# Patient Record
Sex: FEMALE | Race: OTHER
[De-identification: names, ages, dates, MRNs, and addresses within clinical notes are randomized per-mention and may not be internally consistent; named-entity substitution may affect disease eponyms.]

---

## 2019-07-21 ENCOUNTER — HOSPITAL ENCOUNTER (EMERGENCY)
Dept: HOSPITAL 62 - ER | Age: 29
Discharge: HOME | End: 2019-07-21
Payer: SELF-PAY

## 2019-07-21 VITALS — SYSTOLIC BLOOD PRESSURE: 120 MMHG | DIASTOLIC BLOOD PRESSURE: 71 MMHG

## 2019-07-21 DIAGNOSIS — R21: Primary | ICD-10-CM

## 2019-07-21 PROCEDURE — 99282 EMERGENCY DEPT VISIT SF MDM: CPT

## 2019-07-21 NOTE — ER DOCUMENT REPORT
HPI





- HPI


Time Seen by Provider: 07/21/19 19:33


Pain Level: 4


Notes: 





Patient is a 29-year-old female who presents complaining of a rash to her right 

plantar foot that is been present for a couple months without known offending 

agent.  Patient states that she does have dermatitis/eczema and is not sure if 

this is related.  Cortisone cream with minimal relief.  Patient states that the 

areas are starting to get a little thicker and harder and painful when she 

walks.  She has not noticed any redness or purulent discharge.  Denies any drug 

allergies.  She has not been seen for this issue before.  Denies any headache, 

fever, URI, sore throat, chest pain, palpitations, syncope, cough, shortness of 

breath, wheeze, dyspnea, abdominal pain, nausea/vomiting/diarrhea, urinary 

retention, dysuria, hematuria.





- ROS


Systems Reviewed and Negative: Yes All other systems reviewed and negative





- REPRODUCTIVE


Reproductive: DENIES: Pregnant:





Past Medical History





- Social History


Smoking Status: Never Smoker


Family History: CVA, DM, Hypertension


Neurological Medical History: Reports: Hx Migraine


Renal/ Medical History: Reports: Hx Kidney Stones.  Denies: Hx Peritoneal 

Dialysis


Psychiatric Medical History: Reports: Hx Anxiety





- Immunizations


Immunizations up to date: Yes


Hx Diphtheria, Pertussis, Tetanus Vaccination: Yes





Vertical Provider Document





- CONSTITUTIONAL


Agree With Documented VS: Yes


Notes: 





PHYSICAL EXAMINATION:





GENERAL: Well-appearing, well-nourished and in no acute distress.





LUNGS: Breath sounds clear to auscultation bilaterally and equal.  No wheezes 

rales or rhonchi.





HEART: Regular rate and rhythm without murmurs, rubs, gallops.





Musculoskeletal: Rt foot/ankle: No ecchymosis or deformity.  FROM to 

passive/active. Strength 5+/5. N/V intact distal.  No bony tenderness of the 

foot.  Achilles intact.  Lis Franc maneuver neg.  Anterior drawer neg.  + 

lichenified hyperpigmented skin rash/lesions on the plantar foot w/o tenderness,

erythema, abscess, or streaks.





Extremities:  No cyanosis, clubbing, or edema b/l.  Peripheral pulses 2+.  

Capillary refill less than 3 seconds.





NEUROLOGICAL: Normal speech, normal gait.  Normal sensory, motor exams 





PSYCH: Normal mood, normal affect.





SKIN: see above.





- INFECTION CONTROL


TRAVEL OUTSIDE OF THE [place] IN LAST 30 DAYS: No





Course





- Re-evaluation


Re-evalutation: 





07/21/19 19:51


Patient is an afebrile, well-hydrated, 29-year-old female who presents with skin

rash to the plantar right foot, unspecified, but appears benign.  Vitals are 

acceptable without significant tachycardia, tachypnea, or hypoxia.  PE is 

otherwise unremarkable vascular compromise, obvious tendon/leg rupture, obvious 

fracture/dislocation, septic joint.  There is no evidence of bacterial 

infection.  Patient is nontoxic-appearing and is able to tolerate p.o. without 

difficulty.  It has been an ongoing issue for the past couple months.  Cortisone

over-the-counter has not been ongoing for her.  We will try her on a fungal 

cream with expected follow-up to podiatry.  Low suspicion for any necrotizing 

fasciitis, SJS, SSS, drug reaction, sepsis, meningitis, syphilis, Lyme disease, 

Hensel spotted fever, or other systemic emergent condition at this time.  

Patient aware that condition can change from initial presentation and he needs 

to monitor symptoms closely and seek medical attention with any acute changes.  

Recheck with your PCM as needed otherwise.  Return to the ED with any other 

worsening/concerning symptoms as reviewed.  Patient is in agreement.





- Vital Signs


Vital signs: 


                                        











Temp Pulse Resp BP Pulse Ox


 


 98.4 F   90   14   120/71   98 


 


 07/21/19 19:27  07/21/19 19:27  07/21/19 19:27  07/21/19 19:27  07/21/19 19:27














Discharge





- Discharge


Clinical Impression: 


 Rash and nonspecific skin eruption





Condition: Stable


Disposition: HOME, SELF-CARE


Additional Instructions: 


Keep the skin clean


Wash with soap and water


Tylenol/ibuprofen if needed


Triple antibiotic ointment daily for any break in the skin


Epsom salt soaks


Avoid submersion in ocean or pool water for now


Take medication as directed


Monitor for any worsening symptoms


Recheck with your PCM in 3-5 days


Schedule consult podiatry


Return to the ED with any worsening symptoms and/or development of fever, 

headache, chest pain, palpitations, syncope, shortness of breath, trouble 

breathing, abdominal pain, n/v/d, abscess, purulent discharge, red streaks, 

worsening swelling, or other worsening symptoms that are concerning to you.


Prescriptions: 


Clotrimazole [Athletic Foot Cream] 1 applic TP BID #30 gm


Referrals: 


JACKSON SERRATO DPM [ACTIVE STAFF] - Follow up in 3-5 days

## 2020-01-04 ENCOUNTER — HOSPITAL ENCOUNTER (EMERGENCY)
Dept: HOSPITAL 62 - ER | Age: 30
Discharge: HOME | End: 2020-01-04
Payer: SELF-PAY

## 2020-01-04 VITALS — DIASTOLIC BLOOD PRESSURE: 76 MMHG | SYSTOLIC BLOOD PRESSURE: 127 MMHG

## 2020-01-04 DIAGNOSIS — N93.8: Primary | ICD-10-CM

## 2020-01-04 DIAGNOSIS — F17.200: ICD-10-CM

## 2020-01-04 LAB
ADD MANUAL DIFF: NO
ALBUMIN SERPL-MCNC: 4.1 G/DL (ref 3.5–5)
ALP SERPL-CCNC: 73 U/L (ref 38–126)
ANION GAP SERPL CALC-SCNC: 9 MMOL/L (ref 5–19)
APPEARANCE UR: (no result)
APTT PPP: YELLOW S
AST SERPL-CCNC: 34 U/L (ref 14–36)
BASOPHILS # BLD AUTO: 0.1 10^3/UL (ref 0–0.2)
BASOPHILS NFR BLD AUTO: 0.7 % (ref 0–2)
BILIRUB DIRECT SERPL-MCNC: 0.1 MG/DL (ref 0–0.4)
BILIRUB SERPL-MCNC: 0.3 MG/DL (ref 0.2–1.3)
BILIRUB UR QL STRIP: NEGATIVE
BUN SERPL-MCNC: 14 MG/DL (ref 7–20)
CALCIUM: 9.3 MG/DL (ref 8.4–10.2)
CHLORIDE SERPL-SCNC: 106 MMOL/L (ref 98–107)
CO2 SERPL-SCNC: 24 MMOL/L (ref 22–30)
EOSINOPHIL # BLD AUTO: 0.1 10^3/UL (ref 0–0.6)
EOSINOPHIL NFR BLD AUTO: 1.2 % (ref 0–6)
ERYTHROCYTE [DISTWIDTH] IN BLOOD BY AUTOMATED COUNT: 15.6 % (ref 11.5–14)
GLUCOSE SERPL-MCNC: 77 MG/DL (ref 75–110)
GLUCOSE UR STRIP-MCNC: NEGATIVE MG/DL
HCT VFR BLD CALC: 34.9 % (ref 36–47)
HGB BLD-MCNC: 11.7 G/DL (ref 12–15.5)
KETONES UR STRIP-MCNC: NEGATIVE MG/DL
LYMPHOCYTES # BLD AUTO: 3.2 10^3/UL (ref 0.5–4.7)
LYMPHOCYTES NFR BLD AUTO: 33 % (ref 13–45)
MCH RBC QN AUTO: 28.5 PG (ref 27–33.4)
MCHC RBC AUTO-ENTMCNC: 33.4 G/DL (ref 32–36)
MCV RBC AUTO: 85 FL (ref 80–97)
MONOCYTES # BLD AUTO: 0.9 10^3/UL (ref 0.1–1.4)
MONOCYTES NFR BLD AUTO: 9.2 % (ref 3–13)
NEUTROPHILS # BLD AUTO: 5.5 10^3/UL (ref 1.7–8.2)
NEUTS SEG NFR BLD AUTO: 55.9 % (ref 42–78)
PH UR STRIP: 7 [PH] (ref 5–9)
PLATELET # BLD: 259 10^3/UL (ref 150–450)
POTASSIUM SERPL-SCNC: 4 MMOL/L (ref 3.6–5)
PROT SERPL-MCNC: 7.3 G/DL (ref 6.3–8.2)
PROT UR STRIP-MCNC: 30 MG/DL
RBC # BLD AUTO: 4.09 10^6/UL (ref 3.72–5.28)
SP GR UR STRIP: 1.02
TOTAL CELLS COUNTED % (AUTO): 100 %
UROBILINOGEN UR-MCNC: 2 MG/DL (ref ?–2)
WBC # BLD AUTO: 9.8 10^3/UL (ref 4–10.5)

## 2020-01-04 PROCEDURE — 80053 COMPREHEN METABOLIC PANEL: CPT

## 2020-01-04 PROCEDURE — 85025 COMPLETE CBC W/AUTO DIFF WBC: CPT

## 2020-01-04 PROCEDURE — 99284 EMERGENCY DEPT VISIT MOD MDM: CPT

## 2020-01-04 PROCEDURE — 81001 URINALYSIS AUTO W/SCOPE: CPT

## 2020-01-04 PROCEDURE — 81025 URINE PREGNANCY TEST: CPT

## 2020-01-04 PROCEDURE — 36415 COLL VENOUS BLD VENIPUNCTURE: CPT

## 2020-01-04 NOTE — ER DOCUMENT REPORT
ED General





- General


Chief Complaint: Vaginal Bleeding


Stated Complaint: VAGINAL BLEEDING


Time Seen by Provider: 01/04/20 20:01


Mode of Arrival: Ambulatory


Information source: Patient


TRAVEL OUTSIDE OF THE [place] IN LAST 30 DAYS: No





- HPI


Notes: 





Patient presents complaining of vaginal bleeding.  She states that she had a 

normal menstrual period 2 weeks ago now she is having some light spotting for 

the last 3 days.  She states there is no pain associated with it.  She states 

that she has not had this previously.  She does not take any type of hormones.  

She states that the bleeding has not been related to intercourse.  She states 

she is not concerned about any type of infection.  No problems with urination.  

No nausea vomiting or diarrhea.  The bleeding has been intermittent and light.  

No clots.  She is no knowledge that she is pregnant.  There is no significant 

radiation symptoms.  Symptoms are intermittent.  Nothing makes it better or 

worse.





- Related Data


Allergies/Adverse Reactions: 


                                        





No Known Allergies Allergy (Verified 01/28/19 16:38)


   











Past Medical History





- General


Information source: Patient





- Social History


Smoking Status: Current Every Day Smoker


Chew tobacco use (# tins/day): No


Frequency of alcohol use: None


Drug Abuse: None


Family History: CVA, DM, Hypertension


Patient has suicidal ideation: No


Patient has homicidal ideation: No


Neurological Medical History: Reports: Hx Migraine


Renal/ Medical History: Reports: Hx Kidney Stones.  Denies: Hx Peritoneal 

Dialysis


Psychiatric Medical History: Reports: Hx Anxiety





- Immunizations


Immunizations up to date: Yes


Hx Diphtheria, Pertussis, Tetanus Vaccination: Yes





Review of Systems





- Review of Systems


Constitutional: denies: Chills, Fever


Cardiovascular: denies: Chest pain, Palpitations


Respiratory: denies: Cough, Short of breath, Sputum


Gastrointestinal: denies: Diarrhea, Constipation


-: Yes All other systems reviewed and negative





Physical Exam





- Vital signs


Vitals: 





                                        











Temp Pulse Resp BP Pulse Ox


 


 98.7 F   97   14   118/71   99 


 


 01/04/20 19:12  01/04/20 19:12  01/04/20 19:12  01/04/20 19:12  01/04/20 19:12











Interpretation: Normal





- General


General appearance: Appears well, Alert





- HEENT


Head: Normocephalic, Atraumatic


Eyes: Normal


Pupils: PERRL





- Respiratory


Respiratory status: No respiratory distress


Chest status: Nontender


Breath sounds: Normal


Chest palpation: Normal





- Cardiovascular


Rhythm: Regular


Heart sounds: Normal auscultation


Murmur: No





- Abdominal


Inspection: Normal


Distension: No distension


Bowel sounds: Normal


Tenderness: Nontender


Organomegaly: No organomegaly





- Back


Back: Normal, Nontender





- Extremities


General upper extremity: Normal inspection, Nontender, Normal color, Normal ROM,

Normal temperature


General lower extremity: Normal inspection, Nontender, Normal color, Normal ROM,

Normal temperature, Normal weight bearing.  No: Tod's sign





- Neurological


Neuro grossly intact: Yes


Cognition: Normal


Orientation: AAOx4


Vianney Coma Scale Eye Opening: Spontaneous


Victoria Coma Scale Verbal: Oriented


[place] Coma Scale Motor: Obeys Commands


Vianney Coma Scale Total: 15


Speech: Normal


Motor strength normal: LUE, RUE, LLE, RLE


Sensory: Normal





- Psychological


Associated symptoms: Normal affect, Normal mood





- Skin


Skin Temperature: Warm


Skin Moisture: Dry


Skin Color: Normal





Course





- Vital Signs


Vital signs: 





                                        











Temp Pulse Resp BP Pulse Ox


 


 98.7 F   97   14   118/71   99 


 


 01/04/20 19:12  01/04/20 19:12  01/04/20 19:12  01/04/20 19:12  01/04/20 19:12














- Laboratory


Result Diagrams: 


                                 01/04/20 21:00





                                 01/04/20 21:00


Laboratory results interpreted by me: 





                                        











  01/04/20 01/04/20





  20:58 21:00


 


Hgb   11.7 L


 


Hct   34.9 L


 


RDW   15.6 H


 


Urine Protein  30 H 


 


Urine Blood  LARGE H 


 


Urine Urobilinogen  2.0 H 














Discharge





- Discharge


Clinical Impression: 


 Dysfunctional uterine bleeding





Condition: Stable


Disposition: HOME, SELF-CARE


Instructions:  Vaginal Bleeding ([facility])


Forms:  Return to Work

## 2020-01-04 NOTE — ER DOCUMENT REPORT
ED Medical Screen (RME)





- General


Chief Complaint: Vaginal Bleeding


Stated Complaint: VAGINAL BLEEDING


Time Seen by Provider: 01/04/20 20:01


TRAVEL OUTSIDE OF THE [place] IN LAST 30 DAYS: No





- HPI


Notes: 





01/04/20 20:04


Patient is a 29-year-old female with no significant past medical history who 

presents complaining of having light spotting since yesterday primarily when she

wipes.  She is able to eat and drink without difficulty.  She is urinating 

normally and having normal bowel movements.  No other vaginal odor or discharge.

 Patient states that she is because she had a menstrual cycle on 24 December and

this is abnormal for her.  She has no other pain or discomfort.  No fever.





I have treated and performed a rapid initial assessment of this patient.  A 

comprehensive ED assessment and evaluation of the patient, analysis of test 

results and completion of medical decision making process will be conducted by 

additional ED providers.





PHYSICAL EXAMINATION:





GENERAL: Well-appearing, well-nourished and in no acute distress.  A&Ox4.  

Answers questions appropriately.


Abdomen: Limited exam in triage, grossly nontender.





- Related Data


Allergies/Adverse Reactions: 


                                        





No Known Allergies Allergy (Verified 01/28/19 16:38)


   











Past Medical History


Neurological Medical History: Reports: Hx Migraine


Renal/ Medical History: Reports: Hx Kidney Stones.  Denies: Hx Peritoneal 

Dialysis


Psychiatric Medical History: Reports: Hx Anxiety





- Immunizations


Immunizations up to date: Yes


Hx Diphtheria, Pertussis, Tetanus Vaccination: Yes





Physical Exam





- Vital signs


Vitals: 





                                        











Temp Pulse Resp BP Pulse Ox


 


 98.7 F   97   14   118/71   99 


 


 01/04/20 19:12  01/04/20 19:12  01/04/20 19:12  01/04/20 19:12  01/04/20 19:12














Course





- Vital Signs


Vital signs: 





                                        











Temp Pulse Resp BP Pulse Ox


 


 98.7 F   97   14   118/71   99 


 


 01/04/20 19:12  01/04/20 19:12  01/04/20 19:12  01/04/20 19:12  01/04/20 19:12

## 2020-02-29 ENCOUNTER — HOSPITAL ENCOUNTER (EMERGENCY)
Dept: HOSPITAL 62 - ER | Age: 30
Discharge: HOME | End: 2020-02-29
Payer: SELF-PAY

## 2020-02-29 VITALS — SYSTOLIC BLOOD PRESSURE: 122 MMHG | DIASTOLIC BLOOD PRESSURE: 80 MMHG

## 2020-02-29 DIAGNOSIS — S93.401A: Primary | ICD-10-CM
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
## 2020-02-29 PROCEDURE — 99283 EMERGENCY DEPT VISIT LOW MDM: CPT

## 2020-02-29 NOTE — RADIOLOGY REPORT (SQ)
EXAM DESCRIPTION:  ANKLE RIGHT COMPLETE



COMPLETED DATE/TIME:  2/29/2020 12:38 pm



REASON FOR STUDY:  lateral ankle inj



COMPARISON:  None.



NUMBER OF VIEWS:  Three views.



TECHNIQUE:  AP, lateral, and oblique radiographic images acquired of the right ankle.



LIMITATIONS:  None.



FINDINGS:  MINERALIZATION: Normal.

BONES: No acute fracture or dislocation.  No worrisome bone lesions.

JOINTS: No effusions.

SOFT TISSUES: Lateral soft tissue swelling. No foreign body.

OTHER: No other significant finding.



IMPRESSION:  SOFT TISSUE SWELLING.  NO FRACTURE OR ACUTE BONY FINDINGS.



TECHNICAL DOCUMENTATION:  JOB ID:  [id number]

 2011 [employer]- All Rights Reserved



Reading location - IP/workstation name: [email or web address]

## 2020-02-29 NOTE — ER DOCUMENT REPORT
HPI





- HPI


Time Seen by Provider: 02/29/20 12:09


Notes: 





CHIEF COMPLAINT: Right ankle injury





HPI: 29-year-old female presenting to the emergency department complaining of 

right lateral ankle injury yesterday.  Patient rolled the ankle while "playing 

around".  States she was initially able to weight-bear.  No proximal lower leg 

pain.  Denies numbness tingling in the toes





ROS: See HPI - all other systems were reviewed and are otherwise negative


Constitutional: no fever 


Integumentary: no rash 


Allergy: no hives 


Musculoskeletal: + extremity pain or swelling 


Neurological: no numbness/tingling





MEDICATIONS: I agree with the patient medications as charted by the RN.





ALLERGIES: I agree with the allergies as charted by the RN.





PAST MEDICAL HISTORY/PAST SURGICAL HISTORY: Reviewed and agree as charted by RN.





SOCIAL HISTORY: Reviewed and agree as charted by RN.





FAMILY HISTORY: No significant familial comorbid conditions directly related to 

patient complaint





EXAM:


Reviewed vital signs as charted by RN.


CONSTITUTIONAL: Alert and oriented and responds appropriately to questions. 

Well-appearing; well-nourished, mild distress secondary to pain


HEAD: Normocephalic; atraumatic


EYES:  Conjunctivae clear, sclerae non-icteric


ENT: normal nose; no rhinorrhea; moist mucous membranes


NECK: Supple without meningismus


CARD: symmetric distal pulses


RESP: Normal chest excursion without splinting or tachypnea


ABD/GI:  non-distended


BACK:  The back appears normal 


EXT: There is some limited flexion extension of the right foot at the ankle 

secondary to pain.  No proximal fibular pain on palpation of the right lower 

extremity.  There is no tenderness on palpation of the tarsals or metatarsals of

the right foot.  Dorsalis pedis and posterior tibial pulses are present in the 

right foot and ankle.  There is no tenderness over the medial malleolus of the 

right ankle on palpation.  There is moderate soft tissue swelling with 

tenderness over the lateral malleolus of the right ankle on palpation   


SKIN: Normal color for age and race; warm; dry; good turgor; no acute lesions 

noted


NEURO: Moves all extremities equally; Motor and sensory function intact 


PSYCH: The patient's mood and manner are appropriate. Grooming and personal 

hygiene are appropriate.





MDM: 29-year-old female with injury to the lateral right ankle will obtain x-ray

for fracture





- REPRODUCTIVE


Reproductive: DENIES: Pregnant:





Past Medical History





- Social History


Smoking Status: Unknown if Ever Smoked


Family History: CVA, DM, Hypertension


Neurological Medical History: Reports: Hx Migraine


Renal/ Medical History: Reports: Hx Kidney Stones.  Denies: Hx Peritoneal 

Dialysis


Psychiatric Medical History: Reports: Hx Anxiety





- Immunizations


Immunizations up to date: Yes


Hx Diphtheria, Pertussis, Tetanus Vaccination: Yes





Vertical Provider Document





- INFECTION CONTROL


TRAVEL OUTSIDE OF THE [place] IN LAST 30 DAYS: No





Course





- Re-evaluation


Re-evalutation: 





02/29/20 12:43


I do not visualize a malleolus fracture on review of the patient's x-ray 

imaging.  Will treat symptomatically Aircast, crutches, pain management





- Vital Signs


Vital signs: 


                                        











Temp Pulse Resp BP Pulse Ox


 


 98.1 F   96   16   111/85   99 


 


 02/29/20 11:50  02/29/20 11:50  02/29/20 11:50  02/29/20 11:50  02/29/20 11:50














Procedures





- Immobilization


  ** Right Distal Ankle


Time completed: 13:10


Pre-Proc Neuro Vasc Exam: Normal


Immobilizer type: Ankle stirrup, Crutches


Performed by: PCT


Post-Proc Neuro Vasc Exam: Normal, Unchanged from pre-exam


Alignment checked and good: Yes





Discharge





- Discharge


Clinical Impression: 


Right ankle sprain


Qualifiers:


 Encounter type: initial encounter Involved ligament of ankle: unspecified 

ligament Qualified Code(s): S93.401A - Sprain of unspecified ligament of right 

ankle, initial encounter





Condition: Stable


Disposition: HOME, SELF-CARE


Additional Instructions: 


1. ice and elevate the lower extremity as much as possible


2. utilize the crutches as instructed, weightbearing as tolerated


3. medications for pain as prescribed


4. follow up with orthopedics for further evaluation and treatment, call for 

appt.


Prescriptions: 


Naproxen 500 mg PO BID PRN #14 tablet


 PRN Reason: 


Referrals: 


BINH VILLEGAS MD [ACTIVE STAFF] - Follow up as needed